# Patient Record
Sex: MALE | Race: OTHER | NOT HISPANIC OR LATINO | URBAN - METROPOLITAN AREA
[De-identification: names, ages, dates, MRNs, and addresses within clinical notes are randomized per-mention and may not be internally consistent; named-entity substitution may affect disease eponyms.]

---

## 2018-06-28 ENCOUNTER — EMERGENCY (EMERGENCY)
Facility: HOSPITAL | Age: 53
LOS: 1 days | Discharge: HOME | End: 2018-06-28
Attending: EMERGENCY MEDICINE | Admitting: EMERGENCY MEDICINE
Payer: COMMERCIAL

## 2018-06-28 VITALS
TEMPERATURE: 98 F | OXYGEN SATURATION: 100 % | HEART RATE: 100 BPM | DIASTOLIC BLOOD PRESSURE: 118 MMHG | RESPIRATION RATE: 16 BRPM | SYSTOLIC BLOOD PRESSURE: 147 MMHG

## 2018-06-28 VITALS
SYSTOLIC BLOOD PRESSURE: 150 MMHG | RESPIRATION RATE: 16 BRPM | DIASTOLIC BLOOD PRESSURE: 101 MMHG | HEART RATE: 98 BPM | OXYGEN SATURATION: 99 %

## 2018-06-28 DIAGNOSIS — Z98.89 OTHER SPECIFIED POSTPROCEDURAL STATES: Chronic | ICD-10-CM

## 2018-06-28 LAB
ALBUMIN SERPL ELPH-MCNC: 4.8 G/DL — SIGNIFICANT CHANGE UP (ref 3.3–5)
ALP SERPL-CCNC: 94 U/L — SIGNIFICANT CHANGE UP (ref 40–120)
ALT FLD-CCNC: 24 U/L — SIGNIFICANT CHANGE UP (ref 4–41)
APPEARANCE UR: CLEAR — SIGNIFICANT CHANGE UP
AST SERPL-CCNC: 21 U/L — SIGNIFICANT CHANGE UP (ref 4–40)
BASOPHILS # BLD AUTO: 0.05 K/UL — SIGNIFICANT CHANGE UP (ref 0–0.2)
BASOPHILS NFR BLD AUTO: 0.3 % — SIGNIFICANT CHANGE UP (ref 0–2)
BILIRUB SERPL-MCNC: 0.7 MG/DL — SIGNIFICANT CHANGE UP (ref 0.2–1.2)
BILIRUB UR-MCNC: NEGATIVE — SIGNIFICANT CHANGE UP
BLOOD UR QL VISUAL: NEGATIVE — SIGNIFICANT CHANGE UP
BUN SERPL-MCNC: 11 MG/DL — SIGNIFICANT CHANGE UP (ref 7–23)
CALCIUM SERPL-MCNC: 9.9 MG/DL — SIGNIFICANT CHANGE UP (ref 8.4–10.5)
CHLORIDE SERPL-SCNC: 99 MMOL/L — SIGNIFICANT CHANGE UP (ref 98–107)
CO2 SERPL-SCNC: 27 MMOL/L — SIGNIFICANT CHANGE UP (ref 22–31)
COLOR SPEC: SIGNIFICANT CHANGE UP
CREAT SERPL-MCNC: 1.44 MG/DL — HIGH (ref 0.5–1.3)
EOSINOPHIL # BLD AUTO: 0.25 K/UL — SIGNIFICANT CHANGE UP (ref 0–0.5)
EOSINOPHIL NFR BLD AUTO: 1.5 % — SIGNIFICANT CHANGE UP (ref 0–6)
GLUCOSE SERPL-MCNC: 93 MG/DL — SIGNIFICANT CHANGE UP (ref 70–99)
GLUCOSE UR-MCNC: NEGATIVE — SIGNIFICANT CHANGE UP
HCT VFR BLD CALC: 46.1 % — SIGNIFICANT CHANGE UP (ref 39–50)
HGB BLD-MCNC: 15.6 G/DL — SIGNIFICANT CHANGE UP (ref 13–17)
IMM GRANULOCYTES # BLD AUTO: 0.05 # — SIGNIFICANT CHANGE UP
IMM GRANULOCYTES NFR BLD AUTO: 0.3 % — SIGNIFICANT CHANGE UP (ref 0–1.5)
KETONES UR-MCNC: NEGATIVE — SIGNIFICANT CHANGE UP
LEUKOCYTE ESTERASE UR-ACNC: NEGATIVE — SIGNIFICANT CHANGE UP
LYMPHOCYTES # BLD AUTO: 15.8 % — SIGNIFICANT CHANGE UP (ref 13–44)
LYMPHOCYTES # BLD AUTO: 2.63 K/UL — SIGNIFICANT CHANGE UP (ref 1–3.3)
MCHC RBC-ENTMCNC: 29.4 PG — SIGNIFICANT CHANGE UP (ref 27–34)
MCHC RBC-ENTMCNC: 33.8 % — SIGNIFICANT CHANGE UP (ref 32–36)
MCV RBC AUTO: 86.8 FL — SIGNIFICANT CHANGE UP (ref 80–100)
MONOCYTES # BLD AUTO: 1.43 K/UL — HIGH (ref 0–0.9)
MONOCYTES NFR BLD AUTO: 8.6 % — SIGNIFICANT CHANGE UP (ref 2–14)
MUCOUS THREADS # UR AUTO: SIGNIFICANT CHANGE UP
NEUTROPHILS # BLD AUTO: 12.19 K/UL — HIGH (ref 1.8–7.4)
NEUTROPHILS NFR BLD AUTO: 73.5 % — SIGNIFICANT CHANGE UP (ref 43–77)
NITRITE UR-MCNC: NEGATIVE — SIGNIFICANT CHANGE UP
NRBC # FLD: 0 — SIGNIFICANT CHANGE UP
PH UR: 7 — SIGNIFICANT CHANGE UP (ref 4.6–8)
PLATELET # BLD AUTO: 374 K/UL — SIGNIFICANT CHANGE UP (ref 150–400)
PMV BLD: 10.2 FL — SIGNIFICANT CHANGE UP (ref 7–13)
POTASSIUM SERPL-MCNC: 3.8 MMOL/L — SIGNIFICANT CHANGE UP (ref 3.5–5.3)
POTASSIUM SERPL-SCNC: 3.8 MMOL/L — SIGNIFICANT CHANGE UP (ref 3.5–5.3)
PROT SERPL-MCNC: 8.9 G/DL — HIGH (ref 6–8.3)
PROT UR-MCNC: NEGATIVE MG/DL — SIGNIFICANT CHANGE UP
RBC # BLD: 5.31 M/UL — SIGNIFICANT CHANGE UP (ref 4.2–5.8)
RBC # FLD: 13.5 % — SIGNIFICANT CHANGE UP (ref 10.3–14.5)
SODIUM SERPL-SCNC: 141 MMOL/L — SIGNIFICANT CHANGE UP (ref 135–145)
SP GR SPEC: 1.01 — SIGNIFICANT CHANGE UP (ref 1–1.04)
UROBILINOGEN FLD QL: NORMAL MG/DL — SIGNIFICANT CHANGE UP
WBC # BLD: 16.6 K/UL — HIGH (ref 3.8–10.5)
WBC # FLD AUTO: 16.6 K/UL — HIGH (ref 3.8–10.5)
WBC UR QL: SIGNIFICANT CHANGE UP (ref 0–?)

## 2018-06-28 PROCEDURE — 99284 EMERGENCY DEPT VISIT MOD MDM: CPT

## 2018-06-28 RX ORDER — DIPHENHYDRAMINE HCL 50 MG
25 CAPSULE ORAL ONCE
Qty: 0 | Refills: 0 | Status: COMPLETED | OUTPATIENT
Start: 2018-06-28 | End: 2018-06-28

## 2018-06-28 RX ORDER — METOCLOPRAMIDE HCL 10 MG
10 TABLET ORAL ONCE
Qty: 0 | Refills: 0 | Status: COMPLETED | OUTPATIENT
Start: 2018-06-28 | End: 2018-06-28

## 2018-06-28 RX ADMIN — Medication 10 MILLIGRAM(S): at 13:27

## 2018-06-28 RX ADMIN — Medication 25 MILLIGRAM(S): at 13:27

## 2018-06-28 NOTE — ED PROVIDER NOTE - ENMT, MLM
Airway patent, Nasal mucosa clear. Mouth with normal mucosa. Throat has no vesicles, no oropharyngeal exudates and uvula is midline. No stridor.

## 2018-06-28 NOTE — ED ADULT TRIAGE NOTE - CHIEF COMPLAINT QUOTE
Pt c/o BP high since yesterday and HA.  Awoke this am with kathleen orbital edema.  Taking meds as prescribed.  PMH HTN.  Was told his symptoms were allergies previously.  Had recent medication change for HTN.

## 2018-06-28 NOTE — ED PROVIDER NOTE - EYES, MLM
Clear bilaterally, pupils equal, round and reactive to light. Minimal eye lid swelling bilat, no erythema/conjunctival injection.

## 2018-06-28 NOTE — ED ADULT NURSE NOTE - OBJECTIVE STATEMENT
pt A&Ox3 c/o high blood pressure since yesterday pt states he was recently placed on a new BP medication but feels its not working, reports headache denies n/v/d cp sob respirations equal and non labored

## 2018-06-28 NOTE — ED PROVIDER NOTE - MEDICAL DECISION MAKING DETAILS
52 M with htn periorbital edema. will check labs for cr, proteinuria, albumin, headache cocktail 52 M with htn, periorbital edema. will check labs for cr, proteinuria, albumin, headache cocktail

## 2018-06-28 NOTE — ED PROVIDER NOTE - OBJECTIVE STATEMENT
52 M h/o htn, p.w periorbital edema since overnight. Patient says last night started having nasal congestion. Overnight developed bilat periorbital edema. No pain/ itching. No swelling in LE. No change in vision. No skin itching. No change in urine amount/color. 52 M h/o htn, p.w periorbital edema since overnight. Patient says last night started having nasal congestion. Overnight developed bilat periorbital edema, which has been improving over the course of the morning. No pain/ itching. No swelling in LE. No change in vision. No skin itching. No change in urine amount/color. No Cp/SOB/orthopnea. No lip swelling. no ACEI.

## 2020-01-13 ENCOUNTER — APPOINTMENT (OUTPATIENT)
Dept: SURGERY | Facility: CLINIC | Age: 55
End: 2020-01-13
Payer: COMMERCIAL

## 2020-01-13 VITALS
BODY MASS INDEX: 26.75 KG/M2 | SYSTOLIC BLOOD PRESSURE: 164 MMHG | HEART RATE: 75 BPM | TEMPERATURE: 98 F | HEIGHT: 63 IN | WEIGHT: 151 LBS | DIASTOLIC BLOOD PRESSURE: 121 MMHG

## 2020-01-13 DIAGNOSIS — K40.90 UNILATERAL INGUINAL HERNIA, W/OUT OBSTRUCTION OR GANGRENE, NOT SPECIFIED AS RECURRENT: ICD-10-CM

## 2020-01-13 DIAGNOSIS — Z82.49 FAMILY HISTORY OF ISCHEMIC HEART DISEASE AND OTHER DISEASES OF THE CIRCULATORY SYSTEM: ICD-10-CM

## 2020-01-13 DIAGNOSIS — Z00.00 ENCOUNTER FOR GENERAL ADULT MEDICAL EXAMINATION W/OUT ABNORMAL FINDINGS: ICD-10-CM

## 2020-01-13 DIAGNOSIS — Z86.79 PERSONAL HISTORY OF OTHER DISEASES OF THE CIRCULATORY SYSTEM: ICD-10-CM

## 2020-01-13 PROCEDURE — 99203 OFFICE O/P NEW LOW 30 MIN: CPT

## 2020-01-13 RX ORDER — LOSARTAN POTASSIUM AND HYDROCHLOROTHIAZIDE 12.5; 1 MG/1; MG/1
TABLET ORAL
Refills: 0 | Status: ACTIVE | COMMUNITY

## 2020-01-13 RX ORDER — NIFEDIPINE 10 MG/1
CAPSULE ORAL
Refills: 0 | Status: ACTIVE | COMMUNITY

## 2020-01-13 RX ORDER — AMLODIPINE BESYLATE 5 MG/1
TABLET ORAL
Refills: 0 | Status: ACTIVE | COMMUNITY

## 2020-01-13 NOTE — PHYSICAL EXAM
[Respiratory Effort] : normal respiratory effort [Alert] : alert [Oriented to Time] : oriented to time [Oriented to Person] : oriented to person [Oriented to Place] : oriented to place [Calm] : calm [Abdominal Masses] : No abdominal masses [JVD] : no jugular venous distention  [Abdomen Tenderness] : ~T ~M No abdominal tenderness [de-identified] : ANTONINA RILEY NAD [de-identified] : soft NT ND + RIH and smaller LIH  [de-identified] : EOMI

## 2020-01-13 NOTE — HISTORY OF PRESENT ILLNESS
[de-identified] : 55 yo male with h/o HTN presents for evaluation of  left groin pain. Patient states he noticed some discomfort in the right groin which is worse with certain activities. He denies any nausea or vomiting. No h/o hernia repair as a child.

## 2020-02-03 ENCOUNTER — OUTPATIENT (OUTPATIENT)
Dept: OUTPATIENT SERVICES | Facility: HOSPITAL | Age: 55
LOS: 1 days | Discharge: HOME | End: 2020-02-03
Payer: COMMERCIAL

## 2020-02-03 VITALS
WEIGHT: 154.1 LBS | HEART RATE: 100 BPM | RESPIRATION RATE: 18 BRPM | HEIGHT: 63 IN | DIASTOLIC BLOOD PRESSURE: 99 MMHG | OXYGEN SATURATION: 98 % | SYSTOLIC BLOOD PRESSURE: 139 MMHG | TEMPERATURE: 98 F

## 2020-02-03 DIAGNOSIS — K40.20 BILATERAL INGUINAL HERNIA, WITHOUT OBSTRUCTION OR GANGRENE, NOT SPECIFIED AS RECURRENT: ICD-10-CM

## 2020-02-03 DIAGNOSIS — Z98.89 OTHER SPECIFIED POSTPROCEDURAL STATES: Chronic | ICD-10-CM

## 2020-02-03 DIAGNOSIS — Z01.818 ENCOUNTER FOR OTHER PREPROCEDURAL EXAMINATION: ICD-10-CM

## 2020-02-03 DIAGNOSIS — I10 ESSENTIAL (PRIMARY) HYPERTENSION: ICD-10-CM

## 2020-02-03 DIAGNOSIS — K21.9 GASTRO-ESOPHAGEAL REFLUX DISEASE WITHOUT ESOPHAGITIS: ICD-10-CM

## 2020-02-03 DIAGNOSIS — Z86.73 PERSONAL HISTORY OF TRANSIENT ISCHEMIC ATTACK (TIA), AND CEREBRAL INFARCTION WITHOUT RESIDUAL DEFICITS: ICD-10-CM

## 2020-02-03 DIAGNOSIS — Z98.890 OTHER SPECIFIED POSTPROCEDURAL STATES: Chronic | ICD-10-CM

## 2020-02-03 LAB
ANION GAP SERPL CALC-SCNC: 7 MMOL/L — SIGNIFICANT CHANGE UP (ref 5–17)
APTT BLD: 28.6 SEC — SIGNIFICANT CHANGE UP (ref 27.5–36.3)
BUN SERPL-MCNC: 20 MG/DL — SIGNIFICANT CHANGE UP (ref 7–23)
CALCIUM SERPL-MCNC: 9.5 MG/DL — SIGNIFICANT CHANGE UP (ref 8.5–10.1)
CHLORIDE SERPL-SCNC: 101 MMOL/L — SIGNIFICANT CHANGE UP (ref 96–108)
CO2 SERPL-SCNC: 32 MMOL/L — HIGH (ref 22–31)
CREAT SERPL-MCNC: 1.66 MG/DL — HIGH (ref 0.5–1.3)
GLUCOSE SERPL-MCNC: 100 MG/DL — HIGH (ref 70–99)
HCT VFR BLD CALC: 44.1 % — SIGNIFICANT CHANGE UP (ref 39–50)
HGB BLD-MCNC: 15.3 G/DL — SIGNIFICANT CHANGE UP (ref 13–17)
INR BLD: 0.92 RATIO — SIGNIFICANT CHANGE UP (ref 0.88–1.16)
MCHC RBC-ENTMCNC: 30.1 PG — SIGNIFICANT CHANGE UP (ref 27–34)
MCHC RBC-ENTMCNC: 34.7 GM/DL — SIGNIFICANT CHANGE UP (ref 32–36)
MCV RBC AUTO: 86.6 FL — SIGNIFICANT CHANGE UP (ref 80–100)
NRBC # BLD: 0 /100 WBCS — SIGNIFICANT CHANGE UP (ref 0–0)
PLATELET # BLD AUTO: 355 K/UL — SIGNIFICANT CHANGE UP (ref 150–400)
POTASSIUM SERPL-MCNC: 2.9 MMOL/L — CRITICAL LOW (ref 3.5–5.3)
POTASSIUM SERPL-SCNC: 2.9 MMOL/L — CRITICAL LOW (ref 3.5–5.3)
PROTHROM AB SERPL-ACNC: 10.3 SEC — SIGNIFICANT CHANGE UP (ref 10–12.9)
RBC # BLD: 5.09 M/UL — SIGNIFICANT CHANGE UP (ref 4.2–5.8)
RBC # FLD: 13.2 % — SIGNIFICANT CHANGE UP (ref 10.3–14.5)
SODIUM SERPL-SCNC: 140 MMOL/L — SIGNIFICANT CHANGE UP (ref 135–145)
WBC # BLD: 13.14 K/UL — HIGH (ref 3.8–10.5)
WBC # FLD AUTO: 13.14 K/UL — HIGH (ref 3.8–10.5)

## 2020-02-03 PROCEDURE — 93010 ELECTROCARDIOGRAM REPORT: CPT

## 2020-02-03 RX ORDER — SODIUM CHLORIDE 9 MG/ML
3 INJECTION INTRAMUSCULAR; INTRAVENOUS; SUBCUTANEOUS EVERY 8 HOURS
Refills: 0 | Status: DISCONTINUED | OUTPATIENT
Start: 2020-02-11 | End: 2020-02-11

## 2020-02-03 NOTE — H&P PST ADULT - HISTORY OF PRESENT ILLNESS
54M pmh 54M pmh htn, gerd, tia (2006 on no anticoagulants), seasonal allergies c/o bilateral inguinal pain and swelling found to have bilateral inguinal hernia here for pST for scheduled laparoscopic robotic assisted bilateral inguinal hernia repair

## 2020-02-03 NOTE — H&P PST ADULT - NSICDXPASTMEDICALHX_GEN_ALL_CORE_FT
PAST MEDICAL HISTORY:  HTN (Hypertension)     Personal History CVA (Cerebrovascular Accident)/No Residual Effects 2006

## 2020-02-03 NOTE — H&P PST ADULT - NSANTHOSAYNRD_GEN_A_CORE
No. MESHA screening performed.  STOP BANG Legend: 0-2 = LOW Risk; 3-4 = INTERMEDIATE Risk; 5-8 = HIGH Risk

## 2020-02-03 NOTE — H&P PST ADULT - NSICDXPROBLEM_GEN_ALL_CORE_FT
PROBLEM DIAGNOSES  Problem: Bilateral inguinal hernia, without obstruction or gangrene, not specified as recurrent  Assessment and Plan: Laparoscopic robotic assisted bilateral inguinal hernia repari  Pre-op instructions given, patient verbalized understanding  Chlorhexidine wash instructions given   Pending: medical clearance    Problem: GERD (gastroesophageal reflux disease)  Assessment and Plan: Continue current regimen and medications.     Problem: History of transient ischemic attack (TIA)  Assessment and Plan: 2006, on no meds    Problem: HTN (hypertension)  Assessment and Plan: Continue current regimen and medications.

## 2020-02-03 NOTE — PROVIDER CONTACT NOTE (CRITICAL VALUE NOTIFICATION) - RECOMMENDATIONS
PMD contacted Dr. Ashton wants patient to call him and he will prescribe K+ repletion  Patient notified that K+ level is low and he is to contact his PMD for immediate K+ repletion

## 2020-02-03 NOTE — H&P PST ADULT - ASSESSMENT
54M pmh htn, gerd, tia (2006 on no anticoagulants), seasonal allergies c/o bilateral inguinal pain and swelling found to have bilateral inguinal hernia here for pST for scheduled laparoscopic robotic assisted bilateral inguinal hernia repair  CAPRINI SCORE    AGE RELATED RISK FACTORS                                                       MOBILITY RELATED FACTORS  [ x] Age 41-60 years                                            (1 Point)                  [ ] Bed rest                                                        (1 Point)  [ ] Age: 61-74 years                                           (2 Points)                [ ] Plaster cast                                                   (2 Points)  [ ] Age= 75 years                                              (3 Points)                 [ ] Bed bound for more than 72 hours                   (2 Points)    DISEASE RELATED RISK FACTORS                                               GENDER SPECIFIC FACTORS  [ ] Edema in the lower extremities                       (1 Point)                  [ ] Pregnancy                                                     (1 Point)  [ ] Varicose veins                                               (1 Point)                  [ ] Post-partum < 6 weeks                                   (1 Point)             [x ] BMI > 25 Kg/m2                                            (1 Point)                  [ ] Hormonal therapy  or oral contraception            (1 Point)                 [ ] Sepsis (in the previous month)                        (1 Point)                  [ ] History of pregnancy complications  [ ] Pneumonia or serious lung disease                                               [ ] Unexplained or recurrent                       (1 Point)           (in the previous month)                               (1 Point)  [ ] Abnormal pulmonary function test                     (1 Point)                 SURGERY RELATED RISK FACTORS  [ ] Acute myocardial infarction                              (1 Point)                 [ ]  Section                                            (1 Point)  [ ] Congestive heart failure (in the previous month)  (1 Point)                 [ ] Minor surgery                                                 (1 Point)   [ ] Inflammatory bowel disease                             (1 Point)                 [ ] Arthroscopic surgery                                        (2 Points)  [ ] Central venous access                                    (2 Points)                [x] General surgery lasting more than 45 minutes   (2 Points)       [ ] Stroke (in the previous month)                          (5 Points)               [ ] Elective arthroplasty                                        (5 Points)                                                                                                                                               HEMATOLOGY RELATED FACTORS                                                 TRAUMA RELATED RISK FACTORS  [ ] Prior episodes of VTE                                     (3 Points)                 [ ] Fracture of the hip, pelvis, or leg                       (5 Points)  [ ] Positive family history for VTE                         (3 Points)                 [ ] Acute spinal cord injury (in the previous month)  (5 Points)  [ ] Prothrombin 86538 A                                      (3 Points)                 [ ] Paralysis  (less than 1 month)                          (5 Points)  [ ] Factor V Leiden                                             (3 Points)                 [ ] Multiple Trauma within 1 month                         (5 Points)  [ ] Lupus anticoagulants                                     (3 Points)                                                           [ ] Anticardiolipin antibodies                                (3 Points)                                                       [ ] High homocysteine in the blood                      (3 Points)                                             [ ] Other congenital or acquired thrombophilia       (3 Points)                                                [ ] Heparin induced thrombocytopenia                  (3 Points)                                          Total Score [       4   ]

## 2020-02-10 ENCOUNTER — TRANSCRIPTION ENCOUNTER (OUTPATIENT)
Age: 55
End: 2020-02-10

## 2020-02-11 ENCOUNTER — APPOINTMENT (OUTPATIENT)
Dept: SURGERY | Facility: HOSPITAL | Age: 55
End: 2020-02-11

## 2020-02-11 ENCOUNTER — OUTPATIENT (OUTPATIENT)
Dept: OUTPATIENT SERVICES | Facility: HOSPITAL | Age: 55
LOS: 1 days | Discharge: HOME | End: 2020-02-11
Payer: COMMERCIAL

## 2020-02-11 VITALS
TEMPERATURE: 98 F | DIASTOLIC BLOOD PRESSURE: 87 MMHG | SYSTOLIC BLOOD PRESSURE: 135 MMHG | HEART RATE: 77 BPM | OXYGEN SATURATION: 98 % | RESPIRATION RATE: 24 BRPM

## 2020-02-11 VITALS
WEIGHT: 151.02 LBS | OXYGEN SATURATION: 100 % | HEIGHT: 63 IN | DIASTOLIC BLOOD PRESSURE: 89 MMHG | SYSTOLIC BLOOD PRESSURE: 143 MMHG | RESPIRATION RATE: 17 BRPM | TEMPERATURE: 97 F | HEART RATE: 71 BPM

## 2020-02-11 DIAGNOSIS — Z98.89 OTHER SPECIFIED POSTPROCEDURAL STATES: Chronic | ICD-10-CM

## 2020-02-11 DIAGNOSIS — Z98.890 OTHER SPECIFIED POSTPROCEDURAL STATES: Chronic | ICD-10-CM

## 2020-02-11 PROCEDURE — 49650 LAP ING HERNIA REPAIR INIT: CPT | Mod: AS,50

## 2020-02-11 PROCEDURE — 49650 LAP ING HERNIA REPAIR INIT: CPT | Mod: 50

## 2020-02-11 RX ORDER — MONTELUKAST 4 MG/1
1 TABLET, CHEWABLE ORAL
Qty: 0 | Refills: 0 | DISCHARGE

## 2020-02-11 RX ORDER — ACETAMINOPHEN 500 MG
1000 TABLET ORAL ONCE
Refills: 0 | Status: COMPLETED | OUTPATIENT
Start: 2020-02-11 | End: 2020-02-11

## 2020-02-11 RX ORDER — NIFEDIPINE 30 MG
1 TABLET, EXTENDED RELEASE 24 HR ORAL
Qty: 0 | Refills: 0 | DISCHARGE

## 2020-02-11 RX ORDER — FENTANYL CITRATE 50 UG/ML
25 INJECTION INTRAVENOUS
Refills: 0 | Status: DISCONTINUED | OUTPATIENT
Start: 2020-02-11 | End: 2020-02-11

## 2020-02-11 RX ORDER — SODIUM CHLORIDE 9 MG/ML
1000 INJECTION, SOLUTION INTRAVENOUS
Refills: 0 | Status: DISCONTINUED | OUTPATIENT
Start: 2020-02-11 | End: 2020-02-11

## 2020-02-11 RX ORDER — LOSARTAN/HYDROCHLOROTHIAZIDE 100MG-25MG
1 TABLET ORAL
Qty: 0 | Refills: 0 | DISCHARGE

## 2020-02-11 RX ORDER — FENTANYL CITRATE 50 UG/ML
50 INJECTION INTRAVENOUS
Refills: 0 | Status: DISCONTINUED | OUTPATIENT
Start: 2020-02-11 | End: 2020-02-11

## 2020-02-11 RX ADMIN — Medication 400 MILLIGRAM(S): at 18:18

## 2020-02-11 RX ADMIN — SODIUM CHLORIDE 75 MILLILITER(S): 9 INJECTION, SOLUTION INTRAVENOUS at 18:18

## 2020-02-11 NOTE — ASU DISCHARGE PLAN (ADULT/PEDIATRIC) - CALL YOUR DOCTOR IF YOU HAVE ANY OF THE FOLLOWING:
Wound/Surgical Site with redness, or foul smelling discharge or pus/Swelling that gets worse/Bleeding that does not stop/Nausea and vomiting that does not stop/Fever greater than (need to indicate Fahrenheit or Celsius)

## 2020-02-11 NOTE — BRIEF OPERATIVE NOTE - NSICDXBRIEFPROCEDURE_GEN_ALL_CORE_FT
PROCEDURES:  Robot-assisted repair of bilateral inguinal hernias using da Claudio Xi 11-Feb-2020 18:04:45  [place]

## 2020-02-24 ENCOUNTER — APPOINTMENT (OUTPATIENT)
Dept: SURGERY | Facility: CLINIC | Age: 55
End: 2020-02-24
Payer: COMMERCIAL

## 2020-02-24 VITALS
TEMPERATURE: 98.1 F | HEART RATE: 69 BPM | SYSTOLIC BLOOD PRESSURE: 124 MMHG | WEIGHT: 151 LBS | DIASTOLIC BLOOD PRESSURE: 84 MMHG | BODY MASS INDEX: 26.75 KG/M2 | HEIGHT: 63 IN

## 2020-02-24 PROCEDURE — 99024 POSTOP FOLLOW-UP VISIT: CPT

## 2020-03-20 DIAGNOSIS — Z09 ENCOUNTER FOR FOLLOW-UP EXAMINATION AFTER COMPLETED TREATMENT FOR CONDITIONS OTHER THAN MALIGNANT NEOPLASM: ICD-10-CM

## 2020-03-23 ENCOUNTER — APPOINTMENT (OUTPATIENT)
Dept: SURGERY | Facility: CLINIC | Age: 55
End: 2020-03-23

## 2023-10-04 NOTE — ED PROVIDER NOTE - ATTENDING CONTRIBUTION TO CARE
Seen and examined, NAD at time of eval but c/o pressure-like HA to top of head, bilat mild eye puffiness (prev. told "allergy" and improves with benadryl), also diff. to control HTN with recent med changes x 1 mo. Per pt. PMD has told him has abnl Cr. No CP/SOB, mild ankle edema, no fever/chills, no recent illness. Clear lungs, heart reg, no murmur, soft abd, NT to palp, NT calves. NT sinus, PANTERA, EOMI Seen and examined, NAD at time of eval but c/o pressure-like HA to top of head, bilat mild eye puffiness (prev. told "allergy" and improves with benadryl), also diff. to control HTN with recent med changes x 1 mo. Per pt. PMD has told him has abnl Cr. No CP/SOB, mild ankle edema, no fever/chills, no recent illness. Clear lungs, heart reg, no murmur, soft abd, NT to palp, NT calves. NT sinus, PANTERA, EOMI, CNs intact. Protopic Counseling: Patient may experience a mild burning sensation during topical application. Protopic is not approved in children less than 2 years of age. There have been case reports of hematologic and skin malignancies in patients using topical calcineurin inhibitors although causality is questionable.

## 2024-06-06 ENCOUNTER — APPOINTMENT (OUTPATIENT)
Dept: NEPHROLOGY | Facility: CLINIC | Age: 59
End: 2024-06-06